# Patient Record
Sex: FEMALE
[De-identification: names, ages, dates, MRNs, and addresses within clinical notes are randomized per-mention and may not be internally consistent; named-entity substitution may affect disease eponyms.]

---

## 2021-05-28 ENCOUNTER — HOSPITAL ENCOUNTER (EMERGENCY)
Dept: HOSPITAL 5 - ED | Age: 26
LOS: 2 days | Discharge: PSYCHIATRIC OR SUBSTANCE USE FACILITY | End: 2021-05-30
Payer: COMMERCIAL
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
## 2021-05-28 DIAGNOSIS — Z88.8: ICD-10-CM
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
## 2021-05-28 DIAGNOSIS — Z86.59: ICD-10-CM

## 2021-05-28 DIAGNOSIS — Z00.8: ICD-10-CM
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
## 2021-05-28 DIAGNOSIS — R74.8: ICD-10-CM

## 2021-05-28 DIAGNOSIS — Z20.822: ICD-10-CM

## 2021-05-28 DIAGNOSIS — Z79.899: ICD-10-CM
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
## 2021-05-28 DIAGNOSIS — R45.851: ICD-10-CM

## 2021-05-28 DIAGNOSIS — S62.307A: Primary | ICD-10-CM

## 2021-05-28 DIAGNOSIS — Z91.018: ICD-10-CM

## 2021-05-28 LAB
BACTERIA #/AREA URNS HPF: (no result) /HPF
BILIRUB UR QL STRIP: (no result)
BLOOD UR QL VISUAL: (no result)
BUN SERPL-MCNC: 12 MG/DL (ref 7–17)
BUN/CREAT SERPL: 17 %
CALCIUM SERPL-MCNC: 8.7 MG/DL (ref 8.4–10.2)
HCT VFR BLD CALC: 35.1 % (ref 30.3–42.9)
HEMOLYSIS INDEX: 7
HGB BLD-MCNC: 12.3 GM/DL (ref 10.1–14.3)
HYALINE CASTS #/AREA URNS LPF: 1 /LPF
MCHC RBC AUTO-ENTMCNC: 35 % (ref 30–34)
MCV RBC AUTO: 98 FL (ref 79–97)
MUCOUS THREADS #/AREA URNS HPF: (no result) /HPF
PH UR STRIP: 6 [PH] (ref 5–7)
PLATELET # BLD: 291 K/MM3 (ref 140–440)
RBC # BLD AUTO: 3.6 M/MM3 (ref 3.65–5.03)
RBC #/AREA URNS HPF: 4 /HPF (ref 0–6)
UROBILINOGEN UR-MCNC: < 2 MG/DL (ref ?–2)
WBC #/AREA URNS HPF: 4 /HPF (ref 0–6)

## 2021-05-28 PROCEDURE — 80320 DRUG SCREEN QUANTALCOHOLS: CPT

## 2021-05-28 PROCEDURE — U0003 INFECTIOUS AGENT DETECTION BY NUCLEIC ACID (DNA OR RNA); SEVERE ACUTE RESPIRATORY SYNDROME CORONAVIRUS 2 (SARS-COV-2) (CORONAVIRUS DISEASE [COVID-19]), AMPLIFIED PROBE TECHNIQUE, MAKING USE OF HIGH THROUGHPUT TECHNOLOGIES AS DESCRIBED BY CMS-2020-01-R: HCPCS

## 2021-05-28 PROCEDURE — 81001 URINALYSIS AUTO W/SCOPE: CPT

## 2021-05-28 PROCEDURE — 85027 COMPLETE CBC AUTOMATED: CPT

## 2021-05-28 PROCEDURE — 36415 COLL VENOUS BLD VENIPUNCTURE: CPT

## 2021-05-28 PROCEDURE — 96361 HYDRATE IV INFUSION ADD-ON: CPT

## 2021-05-28 PROCEDURE — 84443 ASSAY THYROID STIM HORMONE: CPT

## 2021-05-28 PROCEDURE — 73130 X-RAY EXAM OF HAND: CPT

## 2021-05-28 PROCEDURE — 96360 HYDRATION IV INFUSION INIT: CPT

## 2021-05-28 PROCEDURE — G0480 DRUG TEST DEF 1-7 CLASSES: HCPCS

## 2021-05-28 PROCEDURE — 85025 COMPLETE CBC W/AUTO DIFF WBC: CPT

## 2021-05-28 PROCEDURE — 84702 CHORIONIC GONADOTROPIN TEST: CPT

## 2021-05-28 PROCEDURE — 83735 ASSAY OF MAGNESIUM: CPT

## 2021-05-28 PROCEDURE — 99285 EMERGENCY DEPT VISIT HI MDM: CPT

## 2021-05-28 PROCEDURE — 80307 DRUG TEST PRSMV CHEM ANLYZR: CPT

## 2021-05-28 PROCEDURE — 80048 BASIC METABOLIC PNL TOTAL CA: CPT

## 2021-05-28 PROCEDURE — 82550 ASSAY OF CK (CPK): CPT

## 2021-05-28 PROCEDURE — 29125 APPL SHORT ARM SPLINT STATIC: CPT

## 2021-05-28 NOTE — XRAY REPORT
RIGHT HAND 3 VIEWS



INDICATION / CLINICAL INFORMATION:

b/l hand pain



COMPARISON:

None available.

 

FINDINGS:



BONES / JOINT(S): No acute fracture or subluxation. No significant arthritis. 



There is a sclerotic focus in the capitate bone. This is benign-appearing and may represent a bone is
land.





SOFT TISSUES: No significant abnormality.



ADDITIONAL FINDINGS: None.











LEFT HAND 3 VIEWS



INDICATION / CLINICAL INFORMATION:

b/l hand pain



COMPARISON:

None available.

 

FINDINGS:



BONES / JOINT(S): There is a fracture of the distal aspect of the fifth carpal. Fracture is obliquely
 oriented no other fractures are seen. No significant arthritis.

SOFT TISSUES: There is soft tissue swelling at the fracture site.



ADDITIONAL FINDINGS: None.











Signer Name: Adi Stephens MD 

Signed: 5/28/2021 5:53 PM

Workstation Name: [id number]

## 2021-05-28 NOTE — EMERGENCY DEPARTMENT REPORT
ED General Adult HPI





- General


Chief complaint: Psych


Stated complaint: SI


PUI?: No


Time Seen by Provider: 05/28/21 13:33


Source: patient, EMS (Verbal report received from emergency medical services.  

EMS documentation not available at time of chart dictation ), RN notes reviewed


Mode of arrival: Stretcher


Limitations: Other (Acute psychosis)





- History of Present Illness


Initial comments: 





The patient was evaluated in the emergency department for symptoms described in 

the history of present illness.  He/she was evaluated in the context of the 

global COVID-19 pandemic, which necessitated consideration that the patient 

might be at risk for infection with the virus that causes COVID-19.  

Institutional protocols and algorithms that pertain to the evaluation of 

patients at risk for COVID-19 are in a state of rapid change based on 

information released by regulatory bodies including the CDC and federal and 

state organizations.  These policies and algorithms were followed during the 

patient's care in the emergency department.  Please note that these policies, 

procedures and recommendations changed on a rapid basis.





During the entire history and physical examination, I am chaperoned by nurse 

Amy Wong





This is a 25-year-old female.  She is not known to myself previously.  Patient 

is brought to the hospital by EMS with a complaint of suicidality.  Patient is 

initially agitated, belligerent, verbally abusive, combative, running around the

psychiatric hold, yelling "fuck", banging on walls, and yelling "let me out of 

this bitch!"


The patient would initially not talk to me.  As per collateral information from 

EMS who dropped the patient off, patient was in police custody, and endorsed 

suicidality in the back of the police car.  Emergency medical services were 

activated, and the patient told EMS that she was feeling suicidal.  She was thus

brought to the emergency room for evaluation.





Eventually, the patient Deescalated, but it required show of force, verbal de-

escalation techniques, as well as placing her on a psychiatric hold and 

initiating a 1013.  The patient states she does not want to hurt other people, 

but she "cannot say for sure that you not want to hurt myself."  The patient 

denies overdose, and hallucinations.  She complains of generalized muscular pain

after yelling.  She denies Covid symptomatology.





The patient has a history of depression and is not currently on any 

antidepressants.





The patient does not describe exacerbating factors, relieving factors or 

radiation factors.


-: This morning


Consistency: other


Improves with: other


Worsens with: other





- Related Data


                                    Allergies











Allergy/AdvReac Type Severity Reaction Status Date / Time


 


beef derived (bovine) Allergy  Unknown Verified 05/28/21 20:26


 


carrot Allergy  Unknown Verified 05/28/21 20:26


 


celery Allergy  Unknown Verified 05/28/21 20:26


 


nut - unspecified Allergy  Unknown Verified 05/28/21 20:26


 


peanut Allergy  Unknown Verified 05/28/21 20:26


 


pork derived (porcine) Allergy  Unknown Verified 05/28/21 21:26














ED Review of Systems


ROS: 


Stated complaint: SI


Other details as noted in HPI





Constitutional: see HPI


Eyes: as per HPI


Respiratory: see HPI.  denies: cough


Cardiovascular: denies: chest pain


Gastrointestinal: denies: abdominal pain


Genitourinary: denies: dysuria


Musculoskeletal: back pain, arthralgia, myalgia


Psychiatric: anxiety, suicidal thoughts





ED Physical Exam





- General


Limitations: Other (Patient yelling, screaming, banging on walls)


General appearance: alert, anxious, in distress





- Head


Head exam: Present: atraumatic, normocephalic





- Eye


Eye exam: Present: normal appearance, EOMI





- ENT


ENT exam: Present: normal exam, normal orophraynx, mucous membranes moist, 

normal external ear exam





- Neck


Neck exam: Present: normal inspection, full ROM.  Absent: tenderness, 

meningismus





- Respiratory


Respiratory exam: Present: normal lung sounds bilaterally.  Absent: respiratory 

distress, wheezes, rales, rhonchi, stridor, decreased breath sounds





- Cardiovascular


Cardiovascular Exam: Present: regular rate, normal rhythm, normal heart sounds. 

Absent: bradycardia, tachycardia, irregular rhythm, systolic murmur, diastolic 

murmur, rubs, gallop





- GI/Abdominal


GI/Abdominal exam: Present: soft.  Absent: distended, tenderness, guarding, 

rebound, rigid, pulsatile mass





- Extremities Exam


Extremities exam: Present: normal inspection, full ROM, other (2+ pulses noted 

in the bilateral upper and lower extremities.  There is no palpable cord.   

negative Homans sign.  Muscular compartments are soft.  The pelvis is stable.). 

Absent: pedal edema, calf tenderness





- Back Exam


Back exam: Present: normal inspection, full ROM.  Absent: tenderness, CVA 

tenderness (R), CVA tenderness (L), paraspinal tenderness, vertebral tenderness





- Neurological Exam


Neurological exam: Present: alert, normal gait, other (No facial droop.  Tongue 

midline.  Extraocular movements intact bilaterally.  Facial sensation intact to 

light touch in V1, V2, V3 distribution bilaterally.  5 and a 5 strength in 4 

extremities.  Sensation intact to light touch in 4 extremities.)





- Psychiatric


Psychiatric exam: Present: anxious, suicidal ideation





- Skin


Skin exam: Present: warm, dry, intact, normal color.  Absent: rash





ED Course


                                   Vital Signs











  05/28/21 05/28/21 05/28/21





  14:00 21:32 21:34


 


Temperature 98.2 F  98.4 F


 


Pulse Rate 76  75


 


Respiratory 20 18 18





Rate   


 


Blood Pressure 102/53  


 


Blood Pressure   100/62





[Left]   


 


O2 Sat by Pulse 100  98





Oximetry   














  05/29/21 05/29/21 05/29/21





  02:00 09:09 10:58


 


Temperature 98.2 F 98.6 F 


 


Pulse Rate 72 85 


 


Respiratory 18 20 20





Rate   


 


Blood Pressure   


 


Blood Pressure 100/65 110/60 





[Left]   


 


O2 Sat by Pulse 99 96 96





Oximetry   














  05/29/21 05/30/21 05/30/21





  20:16 02:00 08:30


 


Temperature 98.6 F 98.8 F 98 F


 


Pulse Rate 66 80 81


 


Respiratory 16 16 20





Rate   


 


Blood Pressure   


 


Blood Pressure 104/73 102/62 110/60





[Left]   


 


O2 Sat by Pulse 100 96 99





Oximetry   














- Reevaluation(s)


Reevaluation #1: 





05/28/21 15:25


Differential diagnosis, including but not limited to:


Depression, dysthymia, psychosis, medical clearance for psychiatric placement








Assessment and plan: 25-year-old female, who is reportedly depressed, has 

articulated suicidality, initially presented with acute psychosis, yelling, scr

eaming, banging, cursing, initially did not respond to verbal de-escalation 

techniques or show of force, eventually deescalated, now with persistent 

suicidality.





Place patient on psych hold/1013.  Mental health consultation, obtain 

appropriate laboratory studies.  I suspect initial leukocytosis is likely a 

stress reaction.  Reassess after laboratory studies have resulted.


Reevaluation #2: 





05/28/21 15:40


Laboratory studies fairly unremarkable.  Elevated CK likely secondary to 

psychomotor agitation, likely secondary to patient's initial presentation.  IV 

fluids ordered, this will decrease on its own with IV and oral hydration, and 

rest.  Patient had no tense muscular compartments.  Urinalysis pending at this 

time.











This does not represent a medical contraindication to psychiatric admission, 

evaluation, consultation and placement, especially given young age, and lack of 

medical comorbidities.


05/28/21 18:14


Patient complained of hand pain.  X-rays of the hand were obtained, and 

demonstrated a left-sided fifth metacarpal fracture.  Ulnar gutter splint is ord

ered.  This does not represent a contraindication to psychiatric evaluation at 

this time.  Urinalysis reviewed and appreciated.  Patient denies urinary 

symptoms myself.  Would not initiate antibiotic therapy at this time in a young 

immunocompetent patient, without symptomatology.  Patient remains medically 

suitable at this time for psychiatric placement in consultation.





ED Medical Decision Making





- Lab Data


Result diagrams: 


                                 05/29/21 11:35





                                 05/29/21 11:35








                                   Vital Signs











  05/28/21





  14:00


 


Temperature 98.2 F


 


Pulse Rate 76


 


Respiratory 20





Rate 


 


Blood Pressure 102/53


 


O2 Sat by Pulse 100





Oximetry 











                                   Lab Results











  05/28/21 05/28/21 Range/Units





  14:41 14:41 


 


WBC  14.4 H   (4.5-11.0)  K/mm3


 


RBC  3.60 L   (3.65-5.03)  M/mm3


 


Hgb  12.3   (10.1-14.3)  gm/dl


 


Hct  35.1   (30.3-42.9)  %


 


MCV  98 H   (79-97)  fl


 


MCH  34 H   (28-32)  pg


 


MCHC  35 H   (30-34)  %


 


RDW  13.3   (13.2-15.2)  %


 


Plt Count  291   (140-440)  K/mm3


 


Plasma/Serum Alcohol   < 0.01  (0-0.07)  %











Labs











  05/28/21 05/28/21 05/28/21





  14:41 14:41 14:41


 


WBC  14.4 H  


 


RBC  3.60 L  


 


Hgb  12.3  


 


Hct  35.1  


 


MCV  98 H  


 


MCH  34 H  


 


MCHC  35 H  


 


RDW  13.3  


 


Plt Count  291  


 


Sodium   142 


 


Potassium   3.9 


 


Chloride   107.9 H 


 


Carbon Dioxide   24 


 


Anion Gap   14 


 


BUN   12 


 


Creatinine   0.7 


 


Estimated GFR   > 60 


 


BUN/Creatinine Ratio   17 


 


Glucose   83 


 


Calcium   8.7 


 


Magnesium   2.10 


 


Total Creatine Kinase   1232 H 


 


HCG, Quant    < 2


 


Salicylates   


 


Acetaminophen   


 


Plasma/Serum Alcohol   














  05/28/21 05/28/21 05/28/21





  14:41 14:41 14:41


 


WBC   


 


RBC   


 


Hgb   


 


Hct   


 


MCV   


 


MCH   


 


MCHC   


 


RDW   


 


Plt Count   


 


Sodium   


 


Potassium   


 


Chloride   


 


Carbon Dioxide   


 


Anion Gap   


 


BUN   


 


Creatinine   


 


Estimated GFR   


 


BUN/Creatinine Ratio   


 


Glucose   


 


Calcium   


 


Magnesium   


 


Total Creatine Kinase   


 


HCG, Quant   


 


Salicylates  < 0.3 L  


 


Acetaminophen   5.0 L 


 


Plasma/Serum Alcohol    < 0.01











Critical care attestation.: 


If time is entered above; I have spent that time in minutes in the direct care 

of this critically ill patient, excluding procedure time.








ED Disposition


Clinical Impression: 


 Suicidal ideation, Medical clearance for psychiatric admission, History of dep

ression, Elevated CK





Fracture of metacarpal of left hand, closed


Qualifiers:


 Encounter type: initial encounter Metacarpal bone: fifth Metacarpal location: 

unspecified portion of metacarpal 





Disposition: DC/TX-65 PSY HOSP/PSY UNIT


Is pt being admited?: No


Does the pt Need Aspirin: No


Condition: Good


Instructions:  Rhabdomyolysis, Metacarpal Fracture


Additional Instructions: 


Increase fluids.  Follow up on you elevated CK level.  Orthopedic follow up for 

you hand fracture.


Referrals: 


[facility] [Provider Group] - 3-5 Days


PRIMARY CARE,MD [Primary Care Provider] - 3-5 Days


DOMINGA DAMON MD [Staff Physician] - 3-5 Days

## 2021-05-29 LAB
BASOPHILS # (AUTO): 0 K/MM3 (ref 0–0.1)
BASOPHILS NFR BLD AUTO: 0.4 % (ref 0–1.8)
BUN SERPL-MCNC: 7 MG/DL (ref 7–17)
BUN/CREAT SERPL: 10 %
CALCIUM SERPL-MCNC: 8.8 MG/DL (ref 8.4–10.2)
EOSINOPHIL # BLD AUTO: 0.2 K/MM3 (ref 0–0.4)
EOSINOPHIL NFR BLD AUTO: 2.8 % (ref 0–4.3)
HCT VFR BLD CALC: 35.7 % (ref 30.3–42.9)
HEMOLYSIS INDEX: 2
HGB BLD-MCNC: 12.3 GM/DL (ref 10.1–14.3)
LYMPHOCYTES # BLD AUTO: 2.1 K/MM3 (ref 1.2–5.4)
LYMPHOCYTES NFR BLD AUTO: 28.5 % (ref 13.4–35)
MCHC RBC AUTO-ENTMCNC: 34 % (ref 30–34)
MCV RBC AUTO: 99 FL (ref 79–97)
MONOCYTES # (AUTO): 0.6 K/MM3 (ref 0–0.8)
MONOCYTES % (AUTO): 8 % (ref 0–7.3)
PLATELET # BLD: 281 K/MM3 (ref 140–440)
RBC # BLD AUTO: 3.62 M/MM3 (ref 3.65–5.03)

## 2021-05-29 NOTE — EVENT NOTE
Date: 05/29/21





Patient seen this morning on psych rounding.  Patient resting comfortably with 

no issues.  Initial CK was elevated slightly over thousand.  Repeat CK has been 

ordered for this morning since the patient did receive 3 L of fluids.  Repeat 

chemistries and CBC as well to complete medical clearance.

## 2021-05-29 NOTE — CONSULTATION
History of Present Illness





- Reason for Consult


Consult date: 05/29/21


Reason for consult: MHE


Requesting physician: GOVIND CATHERINE





- History of Present Psychiatric Illness





This is a 25-year-old female.  She is not known to myself previously.  Patient 

is brought to the hospital by EMS with a complaint of suicidality.  Patient is 

initially agitated, belligerent, verbally abusive, combative, running around the

psychiatric hold, yelling "fuck", banging on walls, and yelling "let me out of 

this bitch!"


The patient would initially not talk to me.  As per collateral information from 

EMS who dropped the patient off, patient was in police custody, and endorsed 

suicidality in the back of the police car.  Emergency medical services were 

activated, and the patient told EMS that she was feeling suicidal.  She was thus

brought to the emergency room for evaluation.





Eventually, the patient Deescalated, but it required show of force, verbal de-

escalation techniques, as well as placing her on a psychiatric hold and 

initiating a 1013.  The patient states she does not want to hurt other people, 

but she "cannot say for sure that you not want to hurt myself."  The patient 

denies overdose, and hallucinations.  She complains of generalized muscular pain

after yelling.  She denies Covid symptomatology.





PSYCH HPI








Patient describes a good and stable mood, denies being depressed or excessively 

nervous. Patient eats and sleeps well. Patient denies panic attacks, recurrent 

nightmares or flashbacks. Patient denies symptoms suggestive of OCD or PTSD. 

Patient denies hallucinations, paranoia, thought interference and no features 

suggestive of hypomania or chuy. Patiently completely denies suicidal or 

homicidal thoughts.


 





PAST PSYCHIATRIC HISTORY


Diagnoses:


Suicide attempts or Self-harm behavior:


Prior psychiatric hospitalizations:


Substance Abuse history:


Previous psychiatric medications tried:


Outpatient treatment:





PAST MEDICAL HISTORY:





Family Psychiatric History: None reported or documented





SOCIAL HISTORY


Marital Status:


Living Arrangements:


Employment Status:


Access to guns/weapons:


Education:


History of Abuse:


Legal History:





REVIEW OF SYSTEMS


ROS cannot be reliably obtained from the patient due to her confusion and somno

lence. 





REVIEW OF SYSTEMS


Constitutional: Negative for weight loss


ENT: Negative for stridor


Respiratory: Negative for cough or hemoptysis


All other systems reviewed and are negative


 


MENTAL STATUS EXAMINATION


General Appearance and Behavior: Age appropriate, poor/fair/good hygiene, 

wearing appropriate clothes, lying in bed, good/poor eye contact, 

cooperative/uncooperative polite/irritable with questioning.


Cooperation: Participating/engaged, Withdrawn, Isolative, Threatening, 

Cooperative, Hostile and Guarded


Psychomotor Behavior: Psychomotor agitation, psychomotor retardation, 

unremarkable and within normal limits


Mood: Good, OK, Anxious, Depressed, Great, I don't know and so-so


Affect and affective range: Angry, anxious, constricted, decreased range, 

depressed, dysthymic, euphoric, euthymic, irritable, labile and sad


Thought Process: Fluent/Logical, Tangential, Circumstantial, Perseverative, 

Illogical, Goal-directed, Rambling, Pressured, Blocked, Fragmented and Loose 

associations


Thought Content: Within reality, Poverty, Obsessions, Flight of ideas, 

Illogical, Grandiose, Phobia Paranoid, Ideas of reference, Hallucinations 

including auditory, visual, tactile and olfactory, Hopelessness, Helplessness, 

Phobia and Paranoid


Speech: Normal volume, Regular rate and rhythm, pressured, loud volume, soft 

volume, stutter, paucity of speech, difficulty to understand, abnormalities in p

roduction of speech, confused and blocking


Intellectual Functioning: Average


Suicidal Ideation: Denies SI/Suicidal


Homicidal Ideation: Denies HI/Homicidal


Impulse Control: Impaired/Unimpaired


Insight and Judgment: Normal insight and judgment, Limited insight and judgment,

Impaired


Memory: Normal, Short term memory intact, Short term memory impaired, Long term 

memory intact, Long term memory impaired, Prospective memory intact and 

Prospective memory impaired


Attention: Normal, Distractible, Sustained attention intact, Sustained attention

impaired, Divided attention intact and Divided attention impaired


Orientation: Alert, oriented, anxious, confused, delirious and demented





Diagnoses:








Treatment Plan





MEDICATIONS: 


Risks, benefits and alternatives of medications discussed with the patient, 

questions answered and consent obtained from patient.


PSYCHOTHERAPY: Supportive psychotherapy provided


MEDICAL: Per primary team


DELIRIUM PRECAUTIONS: Please re-orient patient frequently, keep lights on during

the day, and minimize benzodiazepines and opiates as these medications could 

worsen patient's confusion.


SAFETY SITTER:


DISPOSITION:  Do? Do Not Recommend acute inpatient psychiatric hospitalization 

at this time. Case discussed with Dr. Arechiga who agrees with current disposition


LEGAL STATUS:  1013


FOLLOW-UP: Will follow


Thank you for the consult.  Please contact with any questions and/or concerns.


   





Medications and Allergies


                                    Allergies











Allergy/AdvReac Type Severity Reaction Status Date / Time


 


beef derived (bovine) Allergy  Unknown Verified 05/28/21 20:26


 


carrot Allergy  Unknown Verified 05/28/21 20:26


 


celery Allergy  Unknown Verified 05/28/21 20:26


 


nut - unspecified Allergy  Unknown Verified 05/28/21 20:26


 


peanut Allergy  Unknown Verified 05/28/21 20:26


 


pork derived (porcine) Allergy  Unknown Verified 05/28/21 21:26











Active Meds: 


Active Medications





Acetaminophen (Acetaminophen 325 Mg Tab)  650 mg PO Q6HR PRN


   PRN Reason: PAIN


Diphenhydramine HCl (Diphenhydramine 25 Mg Cap)  50 mg PO QHS PRN


   PRN Reason: Insomnia


Haloperidol Lactate (Haloperidol Lactate 5 Mg/1 Ml Inj)  5 mg IM Q6HR PRN


   PRN Reason: Agitation


Ibuprofen (Ibuprofen 400 Mg Tab)  400 mg PO Q6HR PRN


   PRN Reason: Pain , Severe (7-10)


   Last Admin: 05/28/21 18:50 Dose:  400 mg


   Documented by: 


Lorazepam (Lorazepam 2 Mg/Ml Vial)  2 mg IM Q4HR PRN


   PRN Reason: Agitation











Mental Status Exam





- Vital signs


                                Last Vital Signs











Temp  98.6 F   05/29/21 09:09


 


Pulse  85   05/29/21 09:09


 


Resp  20   05/29/21 10:58


 


BP  110/60   05/29/21 09:09


 


Pulse Ox  96   05/29/21 10:58














Results


Result Diagrams: 


                                 05/28/21 14:41





                                 05/28/21 14:41


                              Abnormal lab results











  05/28/21 05/28/21 05/28/21 Range/Units





  14:41 14:41 14:41 


 


WBC  14.4 H    (4.5-11.0)  K/mm3


 


RBC  3.60 L    (3.65-5.03)  M/mm3


 


MCV  98 H    (79-97)  fl


 


MCH  34 H    (28-32)  pg


 


MCHC  35 H    (30-34)  %


 


Chloride   107.9 H   ()  mmol/L


 


Total Creatine Kinase   1232 H   ()  units/L


 


Salicylates    < 0.3 L  (2.8-20.0)  mg/dL


 


Acetaminophen     (10.0-30.0)  ug/mL














  05/28/21 Range/Units





  14:41 


 


WBC   (4.5-11.0)  K/mm3


 


RBC   (3.65-5.03)  M/mm3


 


MCV   (79-97)  fl


 


MCH   (28-32)  pg


 


MCHC   (30-34)  %


 


Chloride   ()  mmol/L


 


Total Creatine Kinase   ()  units/L


 


Salicylates   (2.8-20.0)  mg/dL


 


Acetaminophen  5.0 L  (10.0-30.0)  ug/mL








All other labs normal.

## 2021-05-30 VITALS — SYSTOLIC BLOOD PRESSURE: 110 MMHG | DIASTOLIC BLOOD PRESSURE: 60 MMHG

## 2021-05-30 NOTE — PROGRESS NOTE
Subjective





- Reason for Consult


Consult date: 05/30/21


Reason for consult: aggitation, psychosis





- Chief Complaint


Chief complaint: 


The patient was seen today, but was unable to get her cooperation during the 

evaluation. She is irritable, and uncooperative. She tells me "I don't want to 

be here and that is the reason I am here." She says "I am a vet and no a 

civilian." She then starts talking loudly and states "I'm not talking anymore. 

I'm tired of talking to people about the same thing.'





REVIEW OF SYSTEMS


ROS cannot be reliably obtained from the patient due to her confusion and 

somnolence. 


 


MENTAL STATUS EXAMINATION


General Appearance and Behavior: Age appropriate, irritable, uncooperative 


Cooperation: Hostile, guarded


Psychomotor Behavior: Psychomotor agitation


Mood: irritable


Affect and affective range: Angry


Thought Process: Circumstantial, Perseverative


Thought Content: 


Speech: Increased tone


Suicidal Ideation: 


Homicidal Ideation: 


Impulse Control: Impaired


Insight and Judgment:Poor insight and judgment


Memory: 


Attention: Divided attention


Orientation: alert





Diagnoses:Acute Psychosis





Treatment Plan


Start Depakote DR 125mg po BID


Start Trazodon 50mg po qhs 


Risks, benefits and alternatives of medications discussed with the patient, 

questions answered and consent obtained from patient.


PSYCHOTHERAPY: Supportive psychotherapy provided


MEDICAL: Per primary team


DELIRIUM PRECAUTIONS: Please re-orient patient frequently, keep lights on during

the day, and minimize benzodiazepines and opiates as these medications could 

worsen patient's confusion.


SAFETY SITTER:


DISPOSITION: Recommend acute inpatient psychiatric hospitalization at this time.

Case discussed with Dr. Arechiga who agrees with current disposition


LEGAL STATUS:  1013


FOLLOW-UP: Will follow


Thank you for the consult.  Please contact with any questions and/or concerns.





Mental Status Exam





- Vital signs


                                Last Vital Signs











Temp  98 F   05/30/21 08:30


 


Pulse  81   05/30/21 08:30


 


Resp  20   05/30/21 08:30


 


BP  110/60   05/30/21 08:30


 


Pulse Ox  99   05/30/21 08:30

## 2021-12-20 ENCOUNTER — HOSPITAL ENCOUNTER (EMERGENCY)
Dept: HOSPITAL 5 - ED | Age: 26
Discharge: HOME | End: 2021-12-20
Payer: OTHER GOVERNMENT

## 2021-12-20 VITALS — SYSTOLIC BLOOD PRESSURE: 111 MMHG | DIASTOLIC BLOOD PRESSURE: 74 MMHG

## 2021-12-20 DIAGNOSIS — J40: ICD-10-CM

## 2021-12-20 DIAGNOSIS — R05.9: Primary | ICD-10-CM

## 2021-12-20 PROCEDURE — 93005 ELECTROCARDIOGRAM TRACING: CPT

## 2021-12-20 PROCEDURE — 99282 EMERGENCY DEPT VISIT SF MDM: CPT

## 2021-12-20 NOTE — EMERGENCY DEPARTMENT REPORT
Minor Respiratory





- HPI


Chief Complaint: Upper Respiratory Infection


Stated Complaint: CHESTPAIN/SORETHROAT


Time Seen by Provider: 12/20/21 22:39


Minor Respiratory: Yes Rhinorrhea, Yes Sore Throat, Yes Able to Tolerate Fluids,

Yes Cough, Yes Sick Contacts, Yes Chest Pain


Other History: 26-year-old female smoker presents emerged department complaining

of a 1 week history of continued fever sensation with cough congestion coryza 

with mucus production of unknown etiology.  Ports no hemoptysis hematemesis 

hematochezia, no nausea, no vomiting, no diarrhea





ED Review of Systems


ROS: 


Stated complaint: CHESTPAIN/SORETHROAT


Other details as noted in HPI





Comment: All other systems reviewed and negative





ED Past Medical Hx





- Past Medical History


Previous Medical History?: No





- Social History


Smoking Status: Unknown if ever smoked





- Medications


Home Medications: 


                                Home Medications











 Medication  Instructions  Recorded  Confirmed  Last Taken  Type


 


Albuterol Mdi (or & Nicu Only) 1 puff IH Q4-6H PRN #1 inha 12/20/21  Unknown Rx





[ProAir HFA Inhaler]     


 


Azithromycin [Zithromax] 500 mg PO QDAY #3 tablet 12/20/21  Unknown Rx


 


Benzonatate [Tessalon Perles] 100 mg PO Q8HR #20 capsule 12/20/21  Unknown Rx














Minor Respiratory Exam





- Exam


General: 


Vital signs noted. No distress. Alert and acting appropriately.





HEENT: Yes Moist Mucous Membranes, No Pharyngeal Erythema, No Pharyngeal 

Exudates, No Rhinorrhea, No Conjuctival Injection, No Frontal Tenderness, No 

Maxillary Tenderness


Ear: Neither TM Bulge, Neither TM Erythema, Neither EAC Pain, Neither EAC 

Discharge


Neck: Yes Supple, No Adenopathy


Lungs: Yes Good Air Exchange, Yes Ronchi, Yes Cough, No Wheezes, No Stridor, No 

Labored Respirations, No Retractions, No Use of Accessory Muscles, No Other 

Abnormal Lung Sounds


Heart: Yes Regular, No Murmur


Abdomen: Yes Normal Bowel Sounds, No Tenderness, No Peritoneal Signs


Skin: No Rash, No Edema


Neurologic: 


Alert and oriented, no deficits.








Musculoskeletal: 


Unremarkable.











ED Course


                                   Vital Signs











  12/20/21





  22:27


 


Temperature 98.9 F


 


Pulse Rate 80


 


Respiratory 17





Rate 


 


Blood Pressure 111/74


 


O2 Sat by Pulse 95





Oximetry 














ED Medical Decision Making





- Medical Decision Making





This patient presents with acute cough, most consistent with nonemergent cough. 

Differential diagnosis includes bronchitis, asthma, pneumonia, COVID-19. 

Presentation not consistent with acute bacterial pneumonia, influenza, asthma, 

transient airway hyperresponsiveness. Presentation not consistent with chronic 

causes of cough (including GERD, asthma, postnasal discharge, medication side 

effect, CHF, lung cancer or mass).





Plan: , supportive care, reassess


Critical care attestation.: 


If time is entered above; I have spent that time in minutes in the direct care 

of this critically ill patient, excluding procedure time.








ED Disposition


Clinical Impression: 


 Cough, Bronchitis





Disposition: 01 HOME / SELF CARE / HOMELESS


Is pt being admited?: No


Does the pt Need Aspirin: No


Condition: Stable


Instructions:  Cool Mist Vaporizer, Cough, Adult, Easy-to-Read, Upper 

Respiratory Infection, Adult, Easy-to-Read, Cough, Adult, How to Use a Dry 

Powder Inhaler, Chronic Bronchitis (ED)


Prescriptions: 


Albuterol Mdi (or & Nicu Only) [ProAir HFA Inhaler] 1 puff IH Q4-6H PRN #1 inha


 PRN Reason: Cough


Benzonatate [Tessalon Perles] 100 mg PO Q8HR #20 capsule


Azithromycin [Zithromax] 500 mg PO QDAY #3 tablet


Referrals: 


[facility] [Provider Group] - 3-5 Days

## 2021-12-21 NOTE — ELECTROCARDIOGRAPH REPORT
[facility]

                                       

Test Date:    2021               Test Time:    22:24:39

Pat Name:     VINI MATHEW            Department:   

Patient ID:   [id number]          Room:          

Gender:       F                        Technician:   ANGELI

:          1995               Requested By: VANITA CHANG

Order Number: [id number]              Reading MD:   Ted Samuels

                                 Measurements

Intervals                              Axis          

Rate:         82                       P:            22

RI:           191                      QRS:          37

QRSD:         68                       T:            70

QT:           379                                    

QTc:          443                                    

                           Interpretive Statements

Sinus rhythm

Nonspecific T abnrm, anterolateral leads

No previous ECG available for comparison

Electronically Signed On 2021 17:39:00 EST by Ted Samuels